# Patient Record
Sex: FEMALE | Race: WHITE | URBAN - METROPOLITAN AREA
[De-identification: names, ages, dates, MRNs, and addresses within clinical notes are randomized per-mention and may not be internally consistent; named-entity substitution may affect disease eponyms.]

---

## 2018-03-28 ENCOUNTER — HISTORICAL (OUTPATIENT)
Dept: ADMINISTRATIVE | Facility: HOSPITAL | Age: 33
End: 2018-03-28

## 2018-04-04 ENCOUNTER — HISTORICAL (OUTPATIENT)
Dept: SURGERY | Facility: HOSPITAL | Age: 33
End: 2018-04-04

## 2022-04-10 ENCOUNTER — HISTORICAL (OUTPATIENT)
Dept: ADMINISTRATIVE | Facility: HOSPITAL | Age: 37
End: 2022-04-10

## 2022-04-27 VITALS
SYSTOLIC BLOOD PRESSURE: 131 MMHG | BODY MASS INDEX: 23.95 KG/M2 | DIASTOLIC BLOOD PRESSURE: 84 MMHG | WEIGHT: 143.75 LBS | HEIGHT: 65 IN

## 2022-05-04 NOTE — HISTORICAL [facility] CERNER
This is a historical note converted from [employer]. Formatting and pictures may have been removed.  Please reference [employer] for original formatting and attached multimedia. Indication for Surgery  Patient complains of 4 scalp cysts of varying sizes, states two of them have been there longer than she can remember, may be slowly growing in size, now with two additional cysts above R ear that she noticed 2 weeks ago. Soft, mobile, mildly tender. She has not ever noticed them draining,  Preoperative Diagnosis  subcutaneous scalp mass x 4  Postoperative Diagnosis  dermoid scalp cysts x4  Operation  excision of scalp mass x 4  Surgeon(s)  Miguelina Bower  Anesthesia  General  Estimated Blood Loss  5cc  Findings  dermoid cyst x4  Specimen(s)  Dermoid cyst x4  Complications  none  Technique  After risks, benefits, alternatives discussed, consent reviewed, patient was taken to OR, underwent general anesthesia, and placed in the prone position. The scalp lesions were then located, hair surround each was trimmed,?and prepped with betadine and draped in sterile fashion. A 15 blade was used to make incisions over the length of each lesion, hemostat was then used to circumferentially dissect each cyst capsule. The capsule were then excised. Hemostasis was then obtained using bovie cautery and each incision was closed with PDS suture. The patients hair was then rinsed with saline. The patient was awakened, extubated and taken to PACU in good condition   I agree with resident documentation. I was physically present, supervised resident, ?and discussed plan of care.

## 2022-05-04 NOTE — HISTORICAL [facility] CERNER
This is a historical note converted from [employer]. Formatting and pictures may have been removed.  Please reference [employer] for original formatting and attached multimedia. Chief Complaint  Referral for scalp cyst.  History of Present Illness  33 yo F PMHx depression/anxiety, presents as referral from ED for scalp cysts. Patient complains of 4 scalp cysts of varying sizes, states two of them have been there longer than she can remember, may be slowly growing in size, now with two additional cysts above R ear that she noticed 2 weeks ago. Soft, mobile, mildly tender. She has not ever noticed them draining, hwoever states she used to have a 5th above?R ear that disappeared.?She also endorses decreased appetite and weight loss followed by weight gain over the past several months, followed by endocrinologist at [facility] for [facility] work-up.  Review of Systems  denies f/c, no ha, recent illness, cp/palp, sob/dyspnea, abdominal pain, n/v/c/d  +weight loss, +decreased appetite  Physical Exam  ???Vitals & Measurements  ??T:?36.7? ?C (Oral)? HR:?72(Peripheral)? RR:?12? BP:?109/71?  ??HT:?162.56?cm? HT:?162.56?cm? WT:?68?kg? WT:?68?kg? BMI:?25.73?  NAD, AAO3  unlabored on RA  RR  s/nt/nd  R scalp cysts x4:  - largest at anterior midline 2 inches above hair line, approx 1.5cm in diameter  - posterior just left of midline, aprox 1cm diameter  - 2x above R ear, 0.5cm diameter each  all cysts soft, mobile, encapsulated, mildly ttp, no erythema/drainage  ?  Assessment/Plan  ?  33 yo F scalp cysts x4  - scheduled for excision of scalp cysts in OR on 18  - procedure, r/b/a discussed, consents signed Problem List/Past Medical History  ??Ongoing  ???No qualifying data  ?Historical  ???Acute gastroenteritis  ???None  Procedure/Surgical History   Section (None) (2015), Low cervical  section (2015), DIRECT ADMISSION OF PATIENT FOR HOSPITAL OBSERVATION CARE (2015), HOSPITAL OBSERVATION SERVICE,  PER HOUR (04/23/2015), DIRECT ADMISSION OF PATIENT FOR HOSPITAL OBSERVATION CARE (02/20/2015), HOSPITAL OBSERVATION SERVICE, PER HOUR (02/20/2015), appendix removed, C/S x2, gallbladder removed.  Medications  ??Effexor XR 75 mg oral capsule, extended release, 75 mg= 1 cap(s), Oral, Daily  Allergies  No Known Allergies  Social History  ??Alcohol - Denies Alcohol Use, 02/20/2015  ???Never, 03/08/2018  ??Substance Abuse - Denies Substance Abuse, 02/20/2015  ???Never, 03/08/2018  ??Tobacco - Denies Tobacco Use, 02/20/2015  ???Former smoker, Cigarettes, 03/08/2018  Family History  Family history is unknown  Immunizations  ??Vaccine ?Date ?Status   ?tetanus/diphtheria/pertussis, acel(Tdap) 05/21/2015 Given      [1]  ?  H&P Update:  Patient seen and examined. Scalp lesions marked. Patient to begin abx for UTI. No other changes noted. Ok to proceed to OR.  [1]?Office Visit Note; Vineet Schmidt MD 03/20/2018 09:35 CDT   I agree with resident documentation. I was physically present, supervised resident, ?and discussed plan of care.